# Patient Record
Sex: MALE | NOT HISPANIC OR LATINO | Employment: FULL TIME | URBAN - NONMETROPOLITAN AREA
[De-identification: names, ages, dates, MRNs, and addresses within clinical notes are randomized per-mention and may not be internally consistent; named-entity substitution may affect disease eponyms.]

---

## 2024-08-04 ENCOUNTER — APPOINTMENT (OUTPATIENT)
Dept: RADIOLOGY | Facility: HOSPITAL | Age: 39
End: 2024-08-04
Payer: COMMERCIAL

## 2024-08-04 ENCOUNTER — HOSPITAL ENCOUNTER (EMERGENCY)
Facility: HOSPITAL | Age: 39
Discharge: HOME | End: 2024-08-04
Attending: EMERGENCY MEDICINE
Payer: COMMERCIAL

## 2024-08-04 VITALS
BODY MASS INDEX: 26.98 KG/M2 | DIASTOLIC BLOOD PRESSURE: 82 MMHG | WEIGHT: 158 LBS | SYSTOLIC BLOOD PRESSURE: 120 MMHG | OXYGEN SATURATION: 95 % | RESPIRATION RATE: 16 BRPM | HEART RATE: 97 BPM | HEIGHT: 64 IN

## 2024-08-04 DIAGNOSIS — R51.9 ACUTE NONINTRACTABLE HEADACHE, UNSPECIFIED HEADACHE TYPE: Primary | ICD-10-CM

## 2024-08-04 DIAGNOSIS — R11.2 NAUSEA AND VOMITING, UNSPECIFIED VOMITING TYPE: ICD-10-CM

## 2024-08-04 LAB
ALBUMIN SERPL BCP-MCNC: 5 G/DL (ref 3.4–5)
ALP SERPL-CCNC: 68 U/L (ref 33–120)
ALT SERPL W P-5'-P-CCNC: 103 U/L (ref 10–52)
ANION GAP SERPL CALC-SCNC: 20 MMOL/L (ref 10–20)
AST SERPL W P-5'-P-CCNC: 45 U/L (ref 9–39)
BASOPHILS # BLD AUTO: 0.02 X10*3/UL (ref 0–0.1)
BASOPHILS NFR BLD AUTO: 0.2 %
BILIRUB SERPL-MCNC: 0.3 MG/DL (ref 0–1.2)
BUN SERPL-MCNC: 10 MG/DL (ref 6–23)
CALCIUM SERPL-MCNC: 9.6 MG/DL (ref 8.6–10.3)
CHLORIDE SERPL-SCNC: 100 MMOL/L (ref 98–107)
CO2 SERPL-SCNC: 20 MMOL/L (ref 21–32)
CREAT SERPL-MCNC: 1.28 MG/DL (ref 0.5–1.3)
EGFRCR SERPLBLD CKD-EPI 2021: 73 ML/MIN/1.73M*2
EOSINOPHIL # BLD AUTO: 0.16 X10*3/UL (ref 0–0.7)
EOSINOPHIL NFR BLD AUTO: 1.9 %
ERYTHROCYTE [DISTWIDTH] IN BLOOD BY AUTOMATED COUNT: 12.4 % (ref 11.5–14.5)
GLUCOSE SERPL-MCNC: 107 MG/DL (ref 74–99)
HCT VFR BLD AUTO: 44.7 % (ref 41–52)
HGB BLD-MCNC: 16.4 G/DL (ref 13.5–17.5)
IMM GRANULOCYTES # BLD AUTO: 0.07 X10*3/UL (ref 0–0.7)
IMM GRANULOCYTES NFR BLD AUTO: 0.8 % (ref 0–0.9)
LIPASE SERPL-CCNC: 41 U/L (ref 9–82)
LYMPHOCYTES # BLD AUTO: 3.52 X10*3/UL (ref 1.2–4.8)
LYMPHOCYTES NFR BLD AUTO: 42.1 %
MAGNESIUM SERPL-MCNC: 2.14 MG/DL (ref 1.6–2.4)
MCH RBC QN AUTO: 33.7 PG (ref 26–34)
MCHC RBC AUTO-ENTMCNC: 36.7 G/DL (ref 32–36)
MCV RBC AUTO: 92 FL (ref 80–100)
MONOCYTES # BLD AUTO: 0.55 X10*3/UL (ref 0.1–1)
MONOCYTES NFR BLD AUTO: 6.6 %
NEUTROPHILS # BLD AUTO: 4.04 X10*3/UL (ref 1.2–7.7)
NEUTROPHILS NFR BLD AUTO: 48.4 %
NRBC BLD-RTO: ABNORMAL /100{WBCS}
PLATELET # BLD AUTO: 318 X10*3/UL (ref 150–450)
POTASSIUM SERPL-SCNC: 3.7 MMOL/L (ref 3.5–5.3)
PROT SERPL-MCNC: 7.9 G/DL (ref 6.4–8.2)
RBC # BLD AUTO: 4.86 X10*6/UL (ref 4.5–5.9)
SODIUM SERPL-SCNC: 136 MMOL/L (ref 136–145)
WBC # BLD AUTO: 8.4 X10*3/UL (ref 4.4–11.3)

## 2024-08-04 PROCEDURE — 71250 CT THORAX DX C-: CPT | Mod: FOREIGN READ | Performed by: RADIOLOGY

## 2024-08-04 PROCEDURE — 83690 ASSAY OF LIPASE: CPT | Performed by: EMERGENCY MEDICINE

## 2024-08-04 PROCEDURE — 96361 HYDRATE IV INFUSION ADD-ON: CPT

## 2024-08-04 PROCEDURE — 96374 THER/PROPH/DIAG INJ IV PUSH: CPT

## 2024-08-04 PROCEDURE — 2500000004 HC RX 250 GENERAL PHARMACY W/ HCPCS (ALT 636 FOR OP/ED): Performed by: EMERGENCY MEDICINE

## 2024-08-04 PROCEDURE — 70450 CT HEAD/BRAIN W/O DYE: CPT | Performed by: RADIOLOGY

## 2024-08-04 PROCEDURE — 99285 EMERGENCY DEPT VISIT HI MDM: CPT | Mod: 25

## 2024-08-04 PROCEDURE — 85025 COMPLETE CBC W/AUTO DIFF WBC: CPT | Performed by: EMERGENCY MEDICINE

## 2024-08-04 PROCEDURE — 71250 CT THORAX DX C-: CPT

## 2024-08-04 PROCEDURE — 96375 TX/PRO/DX INJ NEW DRUG ADDON: CPT

## 2024-08-04 PROCEDURE — 2500000004 HC RX 250 GENERAL PHARMACY W/ HCPCS (ALT 636 FOR OP/ED)

## 2024-08-04 PROCEDURE — C9113 INJ PANTOPRAZOLE SODIUM, VIA: HCPCS

## 2024-08-04 PROCEDURE — 36415 COLL VENOUS BLD VENIPUNCTURE: CPT | Performed by: EMERGENCY MEDICINE

## 2024-08-04 PROCEDURE — 80053 COMPREHEN METABOLIC PANEL: CPT | Performed by: EMERGENCY MEDICINE

## 2024-08-04 PROCEDURE — 83735 ASSAY OF MAGNESIUM: CPT | Performed by: EMERGENCY MEDICINE

## 2024-08-04 PROCEDURE — 70450 CT HEAD/BRAIN W/O DYE: CPT

## 2024-08-04 RX ORDER — PANTOPRAZOLE SODIUM 40 MG/10ML
INJECTION, POWDER, LYOPHILIZED, FOR SOLUTION INTRAVENOUS
Status: COMPLETED
Start: 2024-08-04 | End: 2024-08-04

## 2024-08-04 RX ORDER — PANTOPRAZOLE SODIUM 40 MG/10ML
40 INJECTION, POWDER, LYOPHILIZED, FOR SOLUTION INTRAVENOUS DAILY
Status: DISCONTINUED | OUTPATIENT
Start: 2024-08-04 | End: 2024-08-04 | Stop reason: HOSPADM

## 2024-08-04 RX ORDER — ONDANSETRON HYDROCHLORIDE 2 MG/ML
4 INJECTION, SOLUTION INTRAVENOUS ONCE
Status: COMPLETED | OUTPATIENT
Start: 2024-08-04 | End: 2024-08-04

## 2024-08-04 RX ORDER — VENLAFAXINE HYDROCHLORIDE 75 MG/1
75 CAPSULE, EXTENDED RELEASE ORAL DAILY
COMMUNITY

## 2024-08-04 RX ORDER — OMEPRAZOLE 40 MG/1
40 CAPSULE, DELAYED RELEASE ORAL 2 TIMES DAILY
COMMUNITY

## 2024-08-04 ASSESSMENT — PAIN - FUNCTIONAL ASSESSMENT: PAIN_FUNCTIONAL_ASSESSMENT: 0-10

## 2024-08-04 ASSESSMENT — PAIN DESCRIPTION - LOCATION: LOCATION: HEAD

## 2024-08-04 ASSESSMENT — PAIN SCALES - GENERAL: PAINLEVEL_OUTOF10: 8

## 2024-08-04 NOTE — ED TRIAGE NOTES
"Drinking whiskey last night, got up to go to bathroom this a.m. and started vomiting. States black emesis,. While on knees vomiting, patient got dizzy and fell on floor. Patient states history of digestive problems including ulcer, acid reflux, and slow emptying.Now complains of \"severe\" headache.  "

## 2024-08-04 NOTE — ED PROVIDER NOTES
"HPI   Chief Complaint   Patient presents with    Headache     Woke this a.m. with headache. Went to bathroom vomiting \"black stuff\" and got headache.       HPI  Patient is a 38-year-old male presenting to the ED for nausea, vomiting, and headache.  Patient admits that he had too much to drink last night.  He had a lot of whiskey, \"too much to say.\"  He notes that this morning, he got up to use the bathroom when he started feeling nauseous and has since had multiple episodes of vomiting.  He notes that at some point, he stumbled and fell to the floor, so his kids called EMS.  Patient states that he has since developed a \"severe\" headache.  He is unsure whether or not he lost consciousness.  He is unable to recall the details of this morning.  He otherwise has no additional concerns.  He is not on any anticoagulation      Patient History   Past Medical History:   Diagnosis Date    Atelectasis 07/27/2013    Atelectasis    Personal history of other diseases of the respiratory system     History of bronchitis    Personal history of other diseases of the respiratory system     History of pneumothorax     Past Surgical History:   Procedure Laterality Date    LUNG SURGERY  11/19/2014    Lung Surgery     No family history on file.  Social History     Tobacco Use    Smoking status: Every Day     Current packs/day: 1.00     Types: Cigarettes    Smokeless tobacco: Never   Substance Use Topics    Alcohol use: Yes    Drug use: Never       Physical Exam   ED Triage Vitals [08/04/24 0714]   Temp Heart Rate Respirations BP   -- (!) 103 (!) 22 106/78      Pulse Ox Temp src Heart Rate Source Patient Position   98 % -- Monitor --      BP Location FiO2 (%)     -- --       Physical Exam  Vitals and nursing note reviewed.   Constitutional:       General: He is not in acute distress.  HENT:      Head: Normocephalic.      Mouth/Throat:      Mouth: Mucous membranes are moist.   Eyes:      Extraocular Movements: Extraocular movements intact. "      Conjunctiva/sclera: Conjunctivae normal.   Cardiovascular:      Rate and Rhythm: Normal rate and regular rhythm.      Pulses: Normal pulses.   Pulmonary:      Effort: Pulmonary effort is normal. No respiratory distress.      Breath sounds: Normal breath sounds. No wheezing.   Abdominal:      General: There is no distension.      Palpations: Abdomen is soft.      Tenderness: There is no abdominal tenderness.   Musculoskeletal:         General: No swelling.      Cervical back: Neck supple.   Skin:     General: Skin is warm and dry.      Capillary Refill: Capillary refill takes less than 2 seconds.   Neurological:      General: No focal deficit present.      Mental Status: He is alert. Mental status is at baseline.           ED Course & MDM   Diagnoses as of 08/04/24 1637   Acute nonintractable headache, unspecified headache type   Nausea and vomiting, unspecified vomiting type                       No data recorded                      Medical Decision Making  Patient was seen and evaluated for headache, nausea, and vomiting after a night of heavy drinking.  On arrival, vital signs are unremarkable.  Peripheral IV is established and patient is started on a 1 L bolus of normal saline as well as Zofran for nausea.  Additional lab work and imaging are ordered for further evaluation.    CBC is unremarkable.  CMP shows slightly elevated LFTs with AST of 45 and ALT of 103, otherwise unremarkable.  Lipase is normal at 41.  Magnesium is normal at 2.1.  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  No evidence consolidating   infiltrate or effusion.   Signed by Teofilo Jade MD      CT head wo IV contrast   Final Result   NO ACUTE INTRACRANIAL PROCESS. SKULL INTACT        MACRO:   None        Signed by: Prabhu Wisdom 8/4/2024 8:42 AM   Dictation workstation:   [id number]        On reevaluation, patient is sleeping comfortably in bed.  He has not had any additional episodes of  vomiting.  Patient was informed of their lab and imaging results, and all questions and concerns were answered. Discharge planning with close outpatient follow-up was discussed at this time, to which the patient was agreeable. Strict return precautions were given, and patient was discharged home in stable condition.    Procedure  Procedures     Kelsey OBRIEN MD  08/04/24 2376

## 2025-02-10 ENCOUNTER — APPOINTMENT (OUTPATIENT)
Dept: RADIOLOGY | Facility: HOSPITAL | Age: 40
End: 2025-02-10
Payer: COMMERCIAL

## 2025-02-10 ENCOUNTER — HOSPITAL ENCOUNTER (EMERGENCY)
Facility: HOSPITAL | Age: 40
Discharge: HOME | End: 2025-02-10
Payer: COMMERCIAL

## 2025-02-10 ENCOUNTER — APPOINTMENT (OUTPATIENT)
Dept: CARDIOLOGY | Facility: HOSPITAL | Age: 40
End: 2025-02-10
Payer: COMMERCIAL

## 2025-02-10 VITALS
SYSTOLIC BLOOD PRESSURE: 102 MMHG | DIASTOLIC BLOOD PRESSURE: 69 MMHG | TEMPERATURE: 98.8 F | HEART RATE: 81 BPM | RESPIRATION RATE: 16 BRPM | HEIGHT: 64 IN | OXYGEN SATURATION: 100 % | WEIGHT: 158 LBS | BODY MASS INDEX: 26.98 KG/M2

## 2025-02-10 DIAGNOSIS — R10.84 GENERALIZED ABDOMINAL PAIN: Primary | ICD-10-CM

## 2025-02-10 LAB
ALBUMIN SERPL BCP-MCNC: 4.7 G/DL (ref 3.4–5)
ALP SERPL-CCNC: 56 U/L (ref 33–120)
ALT SERPL W P-5'-P-CCNC: 76 U/L (ref 10–52)
ANION GAP SERPL CALC-SCNC: 12 MMOL/L (ref 10–20)
APPEARANCE UR: CLEAR
AST SERPL W P-5'-P-CCNC: 36 U/L (ref 9–39)
BASOPHILS # BLD AUTO: 0.03 X10*3/UL (ref 0–0.1)
BASOPHILS NFR BLD AUTO: 0.3 %
BILIRUB SERPL-MCNC: 0.4 MG/DL (ref 0–1.2)
BILIRUB UR STRIP.AUTO-MCNC: NEGATIVE MG/DL
BUN SERPL-MCNC: 28 MG/DL (ref 6–23)
CALCIUM SERPL-MCNC: 9.4 MG/DL (ref 8.6–10.3)
CHLORIDE SERPL-SCNC: 101 MMOL/L (ref 98–107)
CO2 SERPL-SCNC: 27 MMOL/L (ref 21–32)
COLOR UR: YELLOW
CREAT SERPL-MCNC: 1.01 MG/DL (ref 0.5–1.3)
EGFRCR SERPLBLD CKD-EPI 2021: >90 ML/MIN/1.73M*2
EOSINOPHIL # BLD AUTO: 0.26 X10*3/UL (ref 0–0.7)
EOSINOPHIL NFR BLD AUTO: 2.9 %
ERYTHROCYTE [DISTWIDTH] IN BLOOD BY AUTOMATED COUNT: 12 % (ref 11.5–14.5)
GLUCOSE SERPL-MCNC: 99 MG/DL (ref 74–99)
GLUCOSE UR STRIP.AUTO-MCNC: NORMAL MG/DL
HCT VFR BLD AUTO: 45 % (ref 41–52)
HGB BLD-MCNC: 15.8 G/DL (ref 13.5–17.5)
HOLD SPECIMEN: NORMAL
IMM GRANULOCYTES # BLD AUTO: 0.05 X10*3/UL (ref 0–0.7)
IMM GRANULOCYTES NFR BLD AUTO: 0.6 % (ref 0–0.9)
KETONES UR STRIP.AUTO-MCNC: NEGATIVE MG/DL
LACTATE SERPL-SCNC: 0.6 MMOL/L (ref 0.4–2)
LEUKOCYTE ESTERASE UR QL STRIP.AUTO: NEGATIVE
LIPASE SERPL-CCNC: 37 U/L (ref 9–82)
LYMPHOCYTES # BLD AUTO: 1.95 X10*3/UL (ref 1.2–4.8)
LYMPHOCYTES NFR BLD AUTO: 21.5 %
MCH RBC QN AUTO: 32.8 PG (ref 26–34)
MCHC RBC AUTO-ENTMCNC: 35.1 G/DL (ref 32–36)
MCV RBC AUTO: 93 FL (ref 80–100)
MONOCYTES # BLD AUTO: 0.81 X10*3/UL (ref 0.1–1)
MONOCYTES NFR BLD AUTO: 8.9 %
MUCOUS THREADS #/AREA URNS AUTO: NORMAL /LPF
NEUTROPHILS # BLD AUTO: 5.99 X10*3/UL (ref 1.2–7.7)
NEUTROPHILS NFR BLD AUTO: 65.8 %
NITRITE UR QL STRIP.AUTO: NEGATIVE
NRBC BLD-RTO: 0 /100 WBCS (ref 0–0)
PH UR STRIP.AUTO: 6 [PH]
PLATELET # BLD AUTO: 261 X10*3/UL (ref 150–450)
POTASSIUM SERPL-SCNC: 4 MMOL/L (ref 3.5–5.3)
PROT SERPL-MCNC: 7.2 G/DL (ref 6.4–8.2)
PROT UR STRIP.AUTO-MCNC: ABNORMAL MG/DL
RBC # BLD AUTO: 4.82 X10*6/UL (ref 4.5–5.9)
RBC # UR STRIP.AUTO: NEGATIVE MG/DL
RBC #/AREA URNS AUTO: NORMAL /HPF
SODIUM SERPL-SCNC: 136 MMOL/L (ref 136–145)
SP GR UR STRIP.AUTO: 1.04
UROBILINOGEN UR STRIP.AUTO-MCNC: NORMAL MG/DL
WBC # BLD AUTO: 9.1 X10*3/UL (ref 4.4–11.3)
WBC #/AREA URNS AUTO: NORMAL /HPF

## 2025-02-10 PROCEDURE — 76705 ECHO EXAM OF ABDOMEN: CPT

## 2025-02-10 PROCEDURE — 81001 URINALYSIS AUTO W/SCOPE: CPT | Performed by: FAMILY MEDICINE

## 2025-02-10 PROCEDURE — 2550000001 HC RX 255 CONTRASTS: Performed by: PHYSICIAN ASSISTANT

## 2025-02-10 PROCEDURE — 36415 COLL VENOUS BLD VENIPUNCTURE: CPT | Performed by: FAMILY MEDICINE

## 2025-02-10 PROCEDURE — 83690 ASSAY OF LIPASE: CPT | Performed by: FAMILY MEDICINE

## 2025-02-10 PROCEDURE — 85025 COMPLETE CBC W/AUTO DIFF WBC: CPT | Performed by: FAMILY MEDICINE

## 2025-02-10 PROCEDURE — 74177 CT ABD & PELVIS W/CONTRAST: CPT

## 2025-02-10 PROCEDURE — 99285 EMERGENCY DEPT VISIT HI MDM: CPT | Mod: 25

## 2025-02-10 PROCEDURE — 76705 ECHO EXAM OF ABDOMEN: CPT | Performed by: RADIOLOGY

## 2025-02-10 PROCEDURE — 93005 ELECTROCARDIOGRAM TRACING: CPT

## 2025-02-10 PROCEDURE — 83605 ASSAY OF LACTIC ACID: CPT | Performed by: PHYSICIAN ASSISTANT

## 2025-02-10 PROCEDURE — 80053 COMPREHEN METABOLIC PANEL: CPT | Performed by: FAMILY MEDICINE

## 2025-02-10 PROCEDURE — 74177 CT ABD & PELVIS W/CONTRAST: CPT | Performed by: RADIOLOGY

## 2025-02-10 RX ORDER — ONDANSETRON 4 MG/1
4 TABLET, ORALLY DISINTEGRATING ORAL EVERY 8 HOURS PRN
Qty: 20 TABLET | Refills: 0 | Status: SHIPPED | OUTPATIENT
Start: 2025-02-10 | End: 2025-02-17

## 2025-02-10 RX ORDER — DICYCLOMINE HYDROCHLORIDE 20 MG/1
20 TABLET ORAL
Qty: 30 TABLET | Refills: 0 | Status: SHIPPED | OUTPATIENT
Start: 2025-02-10 | End: 2025-02-20

## 2025-02-10 RX ADMIN — IOHEXOL 75 ML: 350 INJECTION, SOLUTION INTRAVENOUS at 14:40

## 2025-02-10 ASSESSMENT — PAIN DESCRIPTION - DESCRIPTORS: DESCRIPTORS: SHARP

## 2025-02-10 ASSESSMENT — PAIN DESCRIPTION - PAIN TYPE: TYPE: ACUTE PAIN

## 2025-02-10 ASSESSMENT — PAIN SCALES - GENERAL: PAINLEVEL_OUTOF10: 3

## 2025-02-10 ASSESSMENT — COLUMBIA-SUICIDE SEVERITY RATING SCALE - C-SSRS
2. HAVE YOU ACTUALLY HAD ANY THOUGHTS OF KILLING YOURSELF?: NO
1. IN THE PAST MONTH, HAVE YOU WISHED YOU WERE DEAD OR WISHED YOU COULD GO TO SLEEP AND NOT WAKE UP?: NO
6. HAVE YOU EVER DONE ANYTHING, STARTED TO DO ANYTHING, OR PREPARED TO DO ANYTHING TO END YOUR LIFE?: NO

## 2025-02-10 ASSESSMENT — PAIN - FUNCTIONAL ASSESSMENT: PAIN_FUNCTIONAL_ASSESSMENT: 0-10

## 2025-02-10 ASSESSMENT — PAIN DESCRIPTION - FREQUENCY: FREQUENCY: INTERMITTENT

## 2025-02-10 ASSESSMENT — PAIN DESCRIPTION - LOCATION: LOCATION: ABDOMEN

## 2025-02-10 NOTE — LETTER
February 10, 2025    Patient: Sandip Curtis   YOB: 1985   Date of Visit: 2/10/2025       To Whom It May Concern:    Sandip Curtis was seen and treated in our emergency department on 2/10/2025. He  may return to work today without restrictions .    If you have any questions or concerns, please don't hesitate to call.              CC: No Recipients

## 2025-02-12 NOTE — ED PROVIDER NOTES
HPI   Chief Complaint   Patient presents with    Abdominal Pain     Right sided abd pain x 5 days       History of present illness:  39-year-old male presents emergency room for complaints of diffuse abdominal pain but mostly in the right lower side.  Patient states pain began 5 days ago he states is not recall any specific injury or trauma to his abdomen.  He states that he has had some nausea and has also had some loose stools but states is normal for him.  He states he has no other symptoms at time denies any fevers or chills and does not take any daily medications.  Does not currently have a primary care doctor.  He denies any other symptoms at this time he including any dysuria or increased urinary urgency or frequency.  He denies any prior surgeries on his abdomen.  He states the pain does seem to get worse so after meals and especially in the right upper side and sometimes after meals he also becomes very nauseous and feels like he needs to vomit.    Social history: Negative for alcohol and drug use.    Review of systems:   Gen.: No weight loss, fatigue, anorexia, insomnia, fever.   Eyes: No vision loss, double vision, drainage, eye pain.   ENT: No pharyngitis, dry mouth.   Cardiac: No chest pain, palpitations, syncope, near syncope.   Pulmonary: No shortness of breath, cough, hemoptysis.   Heme/lymph: No swollen glands, fever, bleeding.   GI: No change in bowel habits, melena, hematemesis, hematochezia,  vomiting  : No discharge, dysuria, frequency, urgency, hematuria.   Musculoskeletal: No limb pain, joint pain, joint swelling.   Skin: No rashes.   Review of systems is otherwise negative unless stated above or in history of present illness.      Physical exam:  General: Vitals noted, no distress. Afebrile.   EENT: No lymphadenopathy appreciated  Cardiac: Regular, rate, rhythm, no murmur.   Pulmonary: Lungs clear bilaterally with good aeration. No adventitious breath sounds.   Abdomen: Soft, nonsurgical.   Tenderness to palpation of the right upper side as well as in the epigastric area as well on exam, positive Michelle sign No peritoneal signs. Normoactive bowel sounds.   Extremities: No peripheral edema.   Skin: No rash.   Neuro: No focal neurologic deficits  CBC CMP lipase lactate urinalysis unremarkable    Medical decision making:   Testing: CT scan abdomen pelvis with contrast shows no acute findings interpreted radiology ultrasound the right upper quadrant also shows no acute findings interpreted by radiology  Plan: Home-going.  Discussed differential. Will follow-up with general surgery in the next 2-3 days. Return if worse. They understand return precautions and discharge instructions. Patient and family/friend/caregiver are in agreement with this plan. 39-year-old male presents emergency room for complaints of diffuse abdominal pain but mostly in the right lower side.  Patient states pain began 5 days ago he states is not recall any specific injury or trauma to his abdomen.  He states that he has had some nausea and has also had some loose stools but states is normal for him.  He states he has no other symptoms at time denies any fevers or chills and does not take any daily medications.  Does not currently have a primary care doctor.  He denies any other symptoms at this time he including any dysuria or increased urinary urgency or frequency.  He denies any prior surgeries on his abdomen.  He states the pain does seem to get worse so after meals and especially in the right upper side and sometimes after meals he also becomes very nauseous and feels like he needs to vomit.Cardiac: Regular, rate, rhythm, no murmur.   Pulmonary: Lungs clear bilaterally with good aeration. No adventitious breath sounds.   Abdomen: Soft, nonsurgical.  Tenderness to palpation of the right upper side as well as in the epigastric area as well on exam, positive Michelle sign No peritoneal signs. Normoactive bowel sounds.  I explained to  the patient the test results at this time and advised her to follow-up with general surgery at this time for further evaluation given where the pain is at and the worsening symptoms after meals.  Explained to him the recommended onto the low-fat diet for the time being and explained to him and like him to begin taking omeprazole which she confirms he has also been taking at home.  I encouraged her to continue taking this at this time and he will contact general surgery this week for scheduling.   Impression:   1.  Abdominal pain            History provided by:  Patient   used: No            Patient History   Past Medical History:   Diagnosis Date    Atelectasis 07/27/2013    Atelectasis    Personal history of other diseases of the respiratory system     History of bronchitis    Personal history of other diseases of the respiratory system     History of pneumothorax     Past Surgical History:   Procedure Laterality Date    LUNG SURGERY  11/19/2014    Lung Surgery     No family history on file.  Social History     Tobacco Use    Smoking status: Every Day     Current packs/day: 1.00     Types: Cigarettes     Passive exposure: Current    Smokeless tobacco: Never   Substance Use Topics    Alcohol use: Yes    Drug use: Never       Physical Exam   ED Triage Vitals [02/10/25 1032]   Temperature Heart Rate Respirations BP   36 °C (96.8 °F) 83 15 115/79      Pulse Ox Temp Source Heart Rate Source Patient Position   100 % Oral -- --      BP Location FiO2 (%)     -- --       Physical Exam      ED Course & MDM   Diagnoses as of 02/12/25 1607   Generalized abdominal pain                 No data recorded     [place] Coma Scale Score: 15 (02/10/25 1036 : Amanda Mccain RN)                           Medical Decision Making      Procedure  Procedures     Cruz Guardado PA-C  02/12/25 1629

## 2025-02-13 LAB
ATRIAL RATE: 80 BPM
P AXIS: 21 DEGREES
P OFFSET: 205 MS
P ONSET: 168 MS
PR INTERVAL: 112 MS
Q ONSET: 224 MS
QRS COUNT: 13 BEATS
QRS DURATION: 92 MS
QT INTERVAL: 378 MS
QTC CALCULATION(BAZETT): 435 MS
QTC FREDERICIA: 416 MS
R AXIS: 53 DEGREES
T AXIS: 59 DEGREES
T OFFSET: 413 MS
VENTRICULAR RATE: 80 BPM